# Patient Record
Sex: MALE
[De-identification: names, ages, dates, MRNs, and addresses within clinical notes are randomized per-mention and may not be internally consistent; named-entity substitution may affect disease eponyms.]

---

## 2017-01-21 ENCOUNTER — CHARTING TRANS (OUTPATIENT)
Dept: OTHER | Age: 27
End: 2017-01-21

## 2017-01-21 ASSESSMENT — PAIN SCALES - GENERAL: PAINLEVEL_OUTOF10: 0

## 2017-01-28 ENCOUNTER — LAB SERVICES (OUTPATIENT)
Dept: OTHER | Age: 27
End: 2017-01-28

## 2017-01-29 ENCOUNTER — CHARTING TRANS (OUTPATIENT)
Dept: OTHER | Age: 27
End: 2017-01-29

## 2017-01-29 LAB
ALBUMIN SERPL-MCNC: 4 G/DL (ref 3.6–5.1)
ALBUMIN/GLOB SERPL: 1.2 (ref 1–2.4)
ALP SERPL-CCNC: 48 UNITS/L (ref 45–117)
ALT SERPL-CCNC: 49 UNITS/L
ANION GAP SERPL CALC-SCNC: 13 MMOL/L (ref 10–20)
AST SERPL-CCNC: 20 UNITS/L
BASOPHILS # BLD: 0 K/MCL (ref 0–0.3)
BASOPHILS NFR BLD: 0 %
BILIRUB SERPL-MCNC: 0.6 MG/DL (ref 0.2–1)
BUN SERPL-MCNC: 10 MG/DL (ref 10–20)
BUN/CREAT SERPL: 8 (ref 7–25)
CALCIUM SERPL-MCNC: 9.3 MG/DL (ref 8.4–10.2)
CHLORIDE SERPL-SCNC: 104 MMOL/L (ref 98–107)
CHOLEST SERPL-MCNC: 154 MG/DL
CHOLEST/HDLC SERPL: 3
CO2 SERPL-SCNC: 31 MMOL/L (ref 21–32)
CREAT SERPL-MCNC: 1.24 MG/DL (ref 0.67–1.17)
DIFFERENTIAL METHOD BLD: ABNORMAL
EOSINOPHIL # BLD: 0.1 K/MCL (ref 0.1–0.5)
EOSINOPHIL NFR BLD: 2 %
ERYTHROCYTE [DISTWIDTH] IN BLOOD: 13.1 % (ref 11–15)
GLOBULIN SER-MCNC: 3.3 G/DL (ref 2–4)
GLUCOSE SERPL-MCNC: 86 MG/DL (ref 65–99)
HDLC SERPL-MCNC: 51 MG/DL
HEMATOCRIT: 47.8 % (ref 39–51)
HEMOGLOBIN: 15.2 G/DL (ref 13–17)
LDLC SERPL CALC-MCNC: 88 MG/DL
LENGTH OF FAST TIME PATIENT: ABNORMAL HRS
LENGTH OF FAST TIME PATIENT: ABNORMAL HRS
LYMPHOCYTES # BLD: 2.4 K/MCL (ref 1–4.8)
LYMPHOCYTES NFR BLD: 37 %
MEAN CORPUSCULAR HEMOGLOBIN: 29.7 PG (ref 26–34)
MEAN CORPUSCULAR HGB CONC: 31.8 G/DL (ref 32–36.5)
MEAN CORPUSCULAR VOLUME: 93.5 FL (ref 78–100)
MONOCYTES # BLD: 0.5 K/MCL (ref 0.3–0.9)
MONOCYTES NFR BLD: 8 %
NEUTROPHILS # BLD: 3.4 K/MCL (ref 1.8–7.7)
NEUTROPHILS NFR BLD: 53 %
NONHDLC SERPL-MCNC: 103 MG/DL
PLATELET COUNT: 227 K/MCL (ref 140–450)
POTASSIUM SERPL-SCNC: 4.3 MMOL/L (ref 3.4–5.1)
RED CELL COUNT: 5.11 MIL/MCL (ref 4.5–5.9)
SODIUM SERPL-SCNC: 144 MMOL/L (ref 135–145)
TOTAL PROTEIN: 7.3 G/DL (ref 6.4–8.2)
TRIGL SERPL-MCNC: 77 MG/DL
WHITE BLOOD COUNT: 6.4 K/MCL (ref 4.2–11)

## 2018-11-06 VITALS
SYSTOLIC BLOOD PRESSURE: 110 MMHG | DIASTOLIC BLOOD PRESSURE: 68 MMHG | HEIGHT: 67 IN | BODY MASS INDEX: 24.81 KG/M2 | OXYGEN SATURATION: 100 % | TEMPERATURE: 96.1 F | HEART RATE: 56 BPM | WEIGHT: 158.07 LBS | RESPIRATION RATE: 16 BRPM

## 2020-08-24 ENCOUNTER — ANCILLARY PROCEDURE (OUTPATIENT)
Dept: GENERAL RADIOLOGY | Facility: CLINIC | Age: 30
End: 2020-08-24
Attending: INTERNAL MEDICINE
Payer: COMMERCIAL

## 2020-08-24 ENCOUNTER — OFFICE VISIT (OUTPATIENT)
Dept: FAMILY MEDICINE | Facility: CLINIC | Age: 30
End: 2020-08-24
Payer: COMMERCIAL

## 2020-08-24 VITALS
TEMPERATURE: 97.7 F | HEIGHT: 66 IN | WEIGHT: 156 LBS | BODY MASS INDEX: 25.07 KG/M2 | SYSTOLIC BLOOD PRESSURE: 116 MMHG | DIASTOLIC BLOOD PRESSURE: 70 MMHG | OXYGEN SATURATION: 98 % | HEART RATE: 74 BPM

## 2020-08-24 DIAGNOSIS — R07.89 ATYPICAL CHEST PAIN: Primary | ICD-10-CM

## 2020-08-24 DIAGNOSIS — Z13.220 LIPID SCREENING: ICD-10-CM

## 2020-08-24 DIAGNOSIS — R06.83 SNORING: ICD-10-CM

## 2020-08-24 DIAGNOSIS — R01.1 HEART MURMUR: ICD-10-CM

## 2020-08-24 DIAGNOSIS — R07.89 ATYPICAL CHEST PAIN: ICD-10-CM

## 2020-08-24 DIAGNOSIS — R22.2 LUMP OF SKIN OF BACK: ICD-10-CM

## 2020-08-24 LAB
BASOPHILS # BLD AUTO: 0 10E9/L (ref 0–0.2)
BASOPHILS NFR BLD AUTO: 0.4 %
D DIMER PPP FEU-MCNC: <0.3 UG/ML FEU (ref 0–0.5)
DIFFERENTIAL METHOD BLD: NORMAL
EOSINOPHIL # BLD AUTO: 0.1 10E9/L (ref 0–0.7)
EOSINOPHIL NFR BLD AUTO: 1.4 %
ERYTHROCYTE [DISTWIDTH] IN BLOOD BY AUTOMATED COUNT: 12.7 % (ref 10–15)
HCT VFR BLD AUTO: 44.4 % (ref 40–53)
HGB BLD-MCNC: 15.3 G/DL (ref 13.3–17.7)
LYMPHOCYTES # BLD AUTO: 1.6 10E9/L (ref 0.8–5.3)
LYMPHOCYTES NFR BLD AUTO: 27.5 %
MCH RBC QN AUTO: 31.2 PG (ref 26.5–33)
MCHC RBC AUTO-ENTMCNC: 34.5 G/DL (ref 31.5–36.5)
MCV RBC AUTO: 91 FL (ref 78–100)
MONOCYTES # BLD AUTO: 0.5 10E9/L (ref 0–1.3)
MONOCYTES NFR BLD AUTO: 8.1 %
NEUTROPHILS # BLD AUTO: 3.6 10E9/L (ref 1.6–8.3)
NEUTROPHILS NFR BLD AUTO: 62.6 %
PLATELET # BLD AUTO: 215 10E9/L (ref 150–450)
RBC # BLD AUTO: 4.9 10E12/L (ref 4.4–5.9)
TROPONIN I SERPL-MCNC: <0.015 UG/L (ref 0–0.04)
WBC # BLD AUTO: 5.7 10E9/L (ref 4–11)

## 2020-08-24 PROCEDURE — 80061 LIPID PANEL: CPT | Performed by: INTERNAL MEDICINE

## 2020-08-24 PROCEDURE — 99204 OFFICE O/P NEW MOD 45 MIN: CPT | Performed by: INTERNAL MEDICINE

## 2020-08-24 PROCEDURE — 93000 ELECTROCARDIOGRAM COMPLETE: CPT | Performed by: INTERNAL MEDICINE

## 2020-08-24 PROCEDURE — 71046 X-RAY EXAM CHEST 2 VIEWS: CPT

## 2020-08-24 PROCEDURE — 85379 FIBRIN DEGRADATION QUANT: CPT | Performed by: INTERNAL MEDICINE

## 2020-08-24 PROCEDURE — 36415 COLL VENOUS BLD VENIPUNCTURE: CPT | Performed by: INTERNAL MEDICINE

## 2020-08-24 PROCEDURE — 80053 COMPREHEN METABOLIC PANEL: CPT | Performed by: INTERNAL MEDICINE

## 2020-08-24 PROCEDURE — 84484 ASSAY OF TROPONIN QUANT: CPT | Performed by: INTERNAL MEDICINE

## 2020-08-24 PROCEDURE — 85025 COMPLETE CBC W/AUTO DIFF WBC: CPT | Performed by: INTERNAL MEDICINE

## 2020-08-24 ASSESSMENT — MIFFLIN-ST. JEOR: SCORE: 1610.36

## 2020-08-24 NOTE — PROGRESS NOTES
"Subjective     Maged Corbin is a 29 year old male who presents to clinic today for the following health issues:    HPI     Patient presenting to discuss multiple medical concerns.  He has a mass on the right upper back over the right shoulder blade, also has history of snoring, reports his mother has sleep apnea.  He would like that evaluated.  He describes over the last 60 days he has been having some atypical chest pain symptoms, 70 days ago he had midsternal chest pain that woke him up at night as he describes, denies chest pain is exertion, he ran 20 miles last week 3 miles per day without any chest symptoms.  He was tested for COVID twice last was 1 week ago.  He still has residual chest symptoms, he described the symptoms were not there 6 months ago.  Denies any associated cough pleuritic chest pain or shortness of breath or phlegm production.  Denies any wheezing or shortness of breath.  The pain is more in the midsternal but does not radiate to the back.  He lifts weights and benchpress 3-4 times per week, he has been taking Tylenol as needed for pain, the pain has been ranging 3-4 out of 10 3 to 4 weeks ago.  Now the pain is 1-1/2 over 10.  He went to [place] for 1 week, he did not feel the pain, he started running and then started hurting delete that, as described pain does not radiate.  No family history of heart disease his mother has diabetes as well as maternal grandmother.  No previous surgical history.    Review of Systems   Constitutional, HEENT, cardiovascular, pulmonary, GI, , musculoskeletal, neuro, skin, endocrine and psych systems are negative, except as otherwise noted.      Objective    /70 (BP Location: Left arm, Patient Position: Chair, Cuff Size: Adult Regular)   Pulse 74   Temp 97.7  F (36.5  C) (Temporal)   Ht 1.676 m (5' 6\")   Wt 70.8 kg (156 lb)   SpO2 98%   BMI 25.18 kg/m    Body mass index is 25.18 kg/m .  Physical Exam   GENERAL: healthy, alert and no " distress  EYES: Eyes grossly normal to inspection, PERRL and conjunctivae and sclerae normal    NECK: no adenopathy, no asymmetry, masses, or scars and thyroid normal to palpation  RESP: lungs clear to auscultation - no rales, rhonchi or wheezes  Chest: No tenderness over the costal chondral joints  CV: regular rate and rhythm, normal S1 S2, no S3 or S4, grade 2 systolic murmur.  In the left lower sternal borders be appreciated in the left upper and right upper sternal border, no thrill or heave no click or rub, no peripheral edema and peripheral pulses strong  ABDOMEN: soft, nontender, no hepatosplenomegaly, no masses and bowel sounds normal  MS: no gross musculoskeletal defects noted, no edema  SKIN: no suspicious lesions or rashes, there is a 4.5 x 3.5 inches soft tissue lump over the right shoulder blade suspicious of lipoma no overlying skin changes.  Lump not tender to palpation, no purulence collection, no overlying erythema.  NEURO: Normal strength and tone, mentation intact and speech normal  PSYCH: mentation appears normal, affect normal/bright            Assessment & Plan   Problem List Items Addressed This Visit     None      Visit Diagnoses     Atypical chest pain    -  Primary    Relevant Orders    XR Chest 2 Views (Completed)    D dimer, quantitative (Completed)    Troponin I (Completed)    EKG 12-lead complete w/read - Clinics (Completed)    CBC with platelets and differential (Completed)    Comprehensive metabolic panel (BMP + Alb, Alk Phos, ALT, AST, Total. Bili, TP) (Completed)    Heart murmur        Relevant Orders    Echocardiogram Complete    Lipid screening        Relevant Orders    Lipid panel reflex to direct LDL Non-fasting (Completed)    Lump of skin of back        Relevant Orders    GENERAL SURG ADULT REFERRAL    POC US SOFT TISSUE    Snoring        Relevant Orders    SLEEP EVALUATION & MANAGEMENT REFERRAL - ADULT -[facility] [phone]  (Age 18 and up)        "  Probably his chest symptoms are more musculoskeletal, could be anxiety triggered as well.  Possibility of pleurisy or pericarditis, his EKG does show nonspecific diffuse J-point elevation.  He does have a grade 2 murmur that is diffuse in his chest could be functional murmur because of its diffuse nature, rule out LV outflow tract obstruction.  Patient denies any symptoms with exertion though which is reassuring.  He will need to schedule an echo.  Also will refer him to sleep evaluation for possible sleep study.  As for the lump of the back is large in size probably is a lipoma soft in character to 3.5 x 4.5 inches and dimension will refer to general surgery for further evaluation, patient would like to proceed with an ultrasound first.  We will check some labs including screening lipid panel HbA1c and d-dimer to rule out remote possibility of PE and a chest x-ray and cardiac enzyme.  Patient in agreement with the plan.  We may consider cardiac stress test pending evaluation of the echo first.    BMI:   Estimated body mass index is 25.18 kg/m  as calculated from the following:    Height as of this encounter: 1.676 m (5' 6\").    Weight as of this encounter: 70.8 kg (156 lb).   Weight management plan: Discussed healthy diet and exercise guidelines          Work on weight loss  Regular exercise  See Patient Instructions  No follow-ups on file.    Vimal Hernandez MD  [facility]    "

## 2020-08-25 LAB
ALBUMIN SERPL-MCNC: 3.9 G/DL (ref 3.4–5)
ALP SERPL-CCNC: 43 U/L (ref 40–150)
ALT SERPL W P-5'-P-CCNC: 24 U/L (ref 0–70)
ANION GAP SERPL CALCULATED.3IONS-SCNC: 7 MMOL/L (ref 3–14)
AST SERPL W P-5'-P-CCNC: 18 U/L (ref 0–45)
BILIRUB SERPL-MCNC: 0.4 MG/DL (ref 0.2–1.3)
BUN SERPL-MCNC: 9 MG/DL (ref 7–30)
CALCIUM SERPL-MCNC: 9 MG/DL (ref 8.5–10.1)
CHLORIDE SERPL-SCNC: 106 MMOL/L (ref 94–109)
CHOLEST SERPL-MCNC: 206 MG/DL
CO2 SERPL-SCNC: 27 MMOL/L (ref 20–32)
CREAT SERPL-MCNC: 1.19 MG/DL (ref 0.66–1.25)
GFR SERPL CREATININE-BSD FRML MDRD: 81 ML/MIN/{1.73_M2}
GLUCOSE SERPL-MCNC: 66 MG/DL (ref 70–99)
HDLC SERPL-MCNC: 60 MG/DL
LDLC SERPL CALC-MCNC: 107 MG/DL
NONHDLC SERPL-MCNC: 146 MG/DL
POTASSIUM SERPL-SCNC: 4.1 MMOL/L (ref 3.4–5.3)
PROT SERPL-MCNC: 8 G/DL (ref 6.8–8.8)
SODIUM SERPL-SCNC: 140 MMOL/L (ref 133–144)
TRIGL SERPL-MCNC: 196 MG/DL

## 2020-08-26 ENCOUNTER — OFFICE VISIT (OUTPATIENT)
Dept: SURGERY | Facility: CLINIC | Age: 30
End: 2020-08-26
Attending: INTERNAL MEDICINE
Payer: COMMERCIAL

## 2020-08-26 VITALS
SYSTOLIC BLOOD PRESSURE: 120 MMHG | WEIGHT: 156 LBS | BODY MASS INDEX: 25.07 KG/M2 | DIASTOLIC BLOOD PRESSURE: 80 MMHG | HEIGHT: 66 IN | HEART RATE: 58 BPM

## 2020-08-26 DIAGNOSIS — R22.2 LUMP OF SKIN OF BACK: ICD-10-CM

## 2020-08-26 PROCEDURE — 99203 OFFICE O/P NEW LOW 30 MIN: CPT | Performed by: SURGERY

## 2020-08-26 ASSESSMENT — MIFFLIN-ST. JEOR: SCORE: 1610.36

## 2020-08-26 NOTE — PROGRESS NOTES
We are asked by Dr. Hernandez [place] Sharlene to see the patient in consultation concerning a back mass    CC: right posterior back mass    HPI:  He noticed a back mass about  3years ago. It has grown. No pain, drainage, similar lumps elsewhere, history of cancer. No trauma    Past Medical History:   has no past medical history on file.    Past Surgical History:  No past surgical history on file.   Additional abdominal operations: none    Social History:  Social History     Socioeconomic History     Marital status:      Spouse name: Not on file     Number of children: Not on file     Years of education: Not on file     Highest education level: Not on file   Occupational History     Not on file   Social Needs     Financial resource strain: Not on file     Food insecurity     Worry: Not on file     Inability: Not on file     Transportation needs     Medical: Not on file     Non-medical: Not on file   Tobacco Use     Smoking status: Never Smoker     Smokeless tobacco: Never Used   Substance and Sexual Activity     Alcohol use: Yes     Comment: 1 drink per month      Drug use: Never     Sexual activity: Yes     Partners: Female   Lifestyle     Physical activity     Days per week: Not on file     Minutes per session: Not on file     Stress: Not on file   Relationships     Social connections     Talks on phone: Not on file     Gets together: Not on file     Attends Yarsani service: Not on file     Active member of club or organization: Not on file     Attends meetings of clubs or organizations: Not on file     Relationship status: Not on file     Intimate partner violence     Fear of current or ex partner: Not on file     Emotionally abused: Not on file     Physically abused: Not on file     Forced sexual activity: Not on file   Other Topics Concern     Not on file   Social History Narrative     Not on file        Family History:  No family history on file.    Review of Systems:  The 10 point Review of  "Systems is negative other than noted in the HPI and above.    Physical Exam:  /80   Pulse 58   Ht 1.676 m (5' 6\")   Wt 70.8 kg (156 lb)   BMI 25.18 kg/m    General - Well developed, well nourished male in no apparent distress  HEENT:  Head normocephalic and atraumatic, pupils equal and round, conjunctivae clear, no scleral icterus, mucous membranes moist, external ears and nose normal  Neck: Supple without thyromegaly or masses  Lymphatic: No cervical, or supraclavicular lymphadenopathy, no right posterior nodes.  Lungs: normal respiratory effort, no shortness of breath  Back:  5x6cm right back mass, rubbery, mobile, no pit overlying it.   Cardiovascular: no gross edema, good peripheral pulses  Extremities: Warm without edema  Musculoskeletal:  Normal station and gait  Neurologic: nonfocal  Psychiatric: Mood and affect appropriate, thought process good  Skin: Without lesions or rashes, or jaundice    Labs: normal two days ago    Imaging: CXR: normal 8/24/20    Imp:  Probable lipoma, possible cyst, risk of malignancy low.   Could excise, image, leave alone. Needle biopsy not useful. Risks of excision reviewed in detail: bleeding, recurrence, infection, seroma formation.    Plan:He is leaning towards excision.     Copy to PCP  "

## 2020-08-27 ENCOUNTER — HOSPITAL ENCOUNTER (OUTPATIENT)
Dept: CARDIOLOGY | Facility: CLINIC | Age: 30
Discharge: HOME | End: 2020-08-27
Attending: INTERNAL MEDICINE | Admitting: INTERNAL MEDICINE
Payer: COMMERCIAL

## 2020-08-27 DIAGNOSIS — R01.1 HEART MURMUR: ICD-10-CM

## 2020-08-27 PROCEDURE — 93306 TTE W/DOPPLER COMPLETE: CPT

## 2020-08-27 PROCEDURE — 93306 TTE W/DOPPLER COMPLETE: CPT | Mod: 26 | Performed by: INTERNAL MEDICINE

## 2020-08-28 DIAGNOSIS — R01.1 HEART MURMUR: ICD-10-CM

## 2020-08-28 DIAGNOSIS — R93.1 ABNORMAL ECHOCARDIOGRAM: ICD-10-CM

## 2020-08-28 DIAGNOSIS — R07.89 ATYPICAL CHEST PAIN: Primary | ICD-10-CM

## 2020-08-28 NOTE — PROGRESS NOTES
Echocardiogram shows normal LV ejection fraction, mild tricuspid regurg +1, mild left atrial enlargement, slightly elevated right-sided pressures estimated right ventricle systolic pressure of 30 mmHg plus right atrial pressure.  Patient does have a murmur on auscultation probably due to TR.  Refer to cardiology for further evaluation

## 2020-08-28 NOTE — RESULT ENCOUNTER NOTE
"Mason, I reviewed your echo result, the function looks good; both right and left sides of the heart, ejection fraction [squeeze function of the heart] 60 to 65% which is normal, there is mention of mild dilated left upper chamber [\"mild left atrial enlargement] unsure etiology of that and also there is mild leakage of 1 of the valves on the right side, tricuspid regurg which is of no significance by itself, pulmonary pressure estimated is slightly elevated according to the echo reading of [30 mmHg plus right atrial pressure] according to the numbers, I would like you to see cardiology for further evaluation, again these trivial findings of mild left atrial enlargement and slightly elevated pulmonary pressures might be nonsignificant findings as well and would not explain your chest symptoms.  Also reassuring there is no fluid around the heart.  I will put a referral to cardiology who will contact you as a one-time consult.  Any further question happy to address  Dr. Hernandez"

## 2020-08-31 ENCOUNTER — TELEPHONE (OUTPATIENT)
Dept: FAMILY MEDICINE | Facility: CLINIC | Age: 30
End: 2020-08-31

## 2020-08-31 NOTE — TELEPHONE ENCOUNTER
This provider called patient advised him of his lab results Echo result chest x-ray discussed his ongoing symptoms he still complains of chest pain seems more related to stretching or movement probable musculoskeletal pain advised him he can try anti-inflammatories and see if that helps with the symptoms.  Advised him to hold on the strenuous physical activities and weightlifting for now until seen and cleared by cardiology.  Advised him on his echo results he will see cardiology as well who may consider cardiac stress test for him.  His echo does show slightly elevated pulmonary pressure and mild left atrial dilation.  He denies any breathing symptoms or dyspnea or symptoms of chronic lung disease.  Patient was very appreciative of the follow-up phone call and states he is an appointment this week with cardiology.

## 2020-09-02 ENCOUNTER — TELEPHONE (OUTPATIENT)
Dept: CARDIOLOGY | Facility: CLINIC | Age: 30
End: 2020-09-02

## 2020-09-03 ENCOUNTER — OFFICE VISIT (OUTPATIENT)
Dept: CARDIOLOGY | Facility: CLINIC | Age: 30
End: 2020-09-03
Attending: INTERNAL MEDICINE
Payer: COMMERCIAL

## 2020-09-03 VITALS
HEART RATE: 65 BPM | DIASTOLIC BLOOD PRESSURE: 72 MMHG | HEIGHT: 66 IN | BODY MASS INDEX: 25.39 KG/M2 | SYSTOLIC BLOOD PRESSURE: 110 MMHG | WEIGHT: 158 LBS

## 2020-09-03 DIAGNOSIS — R93.1 ABNORMAL ECHOCARDIOGRAM: ICD-10-CM

## 2020-09-03 DIAGNOSIS — R01.1 HEART MURMUR: ICD-10-CM

## 2020-09-03 DIAGNOSIS — R07.89 ATYPICAL CHEST PAIN: ICD-10-CM

## 2020-09-03 PROCEDURE — 99204 OFFICE O/P NEW MOD 45 MIN: CPT | Performed by: INTERNAL MEDICINE

## 2020-09-03 RX ORDER — IBUPROFEN 200 MG
200 TABLET ORAL EVERY 4 HOURS PRN
COMMUNITY

## 2020-09-03 ASSESSMENT — MIFFLIN-ST. JEOR: SCORE: 1619.43

## 2020-09-03 NOTE — PROGRESS NOTES
"Service Date: 09/03/2020      HISTORY OF PRESENT ILLNESS:  It is a pleasure for me to see this very pleasant 29-year-old -American gentleman for evaluation of chest discomfort and \"abnormal\" echocardiogram.      This is a young gentleman with no previous cardiac history.  There is also no family history of premature atherosclerotic disease, no history of diabetes, hypertension, hyperlipidemia.  He does not abuse alcohol, tobacco or drugs.      He recalls his chest pain starting several months ago during the start of the COVID pandemic.  He denies fevers, coughing, chills.  He does recall one night during which the pain was very severe.  It was substernal and stopped him from sleeping.  Since then, his pain is localized around the subxiphoid region.  It is mild.  Occasionally, when he turns his upper body, the pain would increase.  However, it does not increase with other forms of exertion nor does it change with respiration.  There is no nausea or vomiting.      Cardiac examination is completely normal.  I personally reviewed his test findings with him.  His EKG shows benign repolarization changes.  Incidentally, he is also a gentleman who exercises on a regular basis and I think this could well account for the EKG changes as well.  He also denies dizzy spells or syncopal episodes on exertion.  His recent labs look okay.  His HDL is 60 with a total cholesterol of 206, which may be less than the [place] average.      I personally reviewed his echocardiogram.  I do not think the left atrium is enlarged.  It may be at the upper limits of normal.  Fit young athletic folks can have this finding.  Certainly, on the parasternal long axis view, the measurement of 3.5 cm is within normal limits.  As for the atrial area, I think this is falsely enlarged as portions of the pulmonary vein were included in the measurement as well.      His tricuspid regurgitation is not mild.  I think it is even less.  I would grade it " at most trace to mild which is completely a normal variant.  His pulmonary artery pressures could not be accurately measured, but I feel confident that it is within normal limits.  His IVC is not engorged.      We had a nice discussion about his chest discomfort.  It is constantly there and has no features at all of cardiac type chest discomfort.  I do not think pericarditis is very likely.  He does say that the pain is somewhat less with high-dose ibuprofen.  I recommended not taking this for more than a week.      I think overall his symptoms are most likely either musculoskeletal or related to psychologic stress, which he is under due to the recent COVID pandemic.  In any case, I do not think further cardiac workup would be necessary.  Stress test would not be helpful.  I reassured him, and if he has further concerns about his pain, I asked him to consult with his primary physician.         GILBERTO MCGINNIS MD, [facility]             D: 2020   T: 2020   MT: mike      Name:     ALEJANDRO CARRASQUILLO   MRN:      [id number]        Account:      [id number]   :      1990           Service Date: 2020      Document: [id number]

## 2020-09-03 NOTE — PROGRESS NOTES
HPI and Plan:   See dictation    No orders of the defined types were placed in this encounter.      Orders Placed This Encounter   Medications     ibuprofen (ADVIL/MOTRIN) 200 MG tablet     Sig: Take 200 mg by mouth every 4 hours as needed for mild pain (up to 400mg BID)       Encounter Diagnoses   Name Primary?     Atypical chest pain      Heart murmur      Abnormal echocardiogram        CURRENT MEDICATIONS:  Current Outpatient Medications   Medication Sig Dispense Refill     ibuprofen (ADVIL/MOTRIN) 200 MG tablet Take 200 mg by mouth every 4 hours as needed for mild pain (up to 400mg BID)         ALLERGIES   No Known Allergies    PAST MEDICAL HISTORY:  History reviewed. No pertinent past medical history.    PAST SURGICAL HISTORY:  History reviewed. No pertinent surgical history.    FAMILY HISTORY:  Family History   Problem Relation Age of Onset     Hyperlipidemia Mother      Diabetes Mother      Unknown/Adopted Father      Diabetes Maternal Grandmother        SOCIAL HISTORY:  Social History     Socioeconomic History     Marital status:      Spouse name: None     Number of children: None     Years of education: None     Highest education level: None   Occupational History     None   Social Needs     Financial resource strain: None     Food insecurity     Worry: None     Inability: None     Transportation needs     Medical: None     Non-medical: None   Tobacco Use     Smoking status: Never Smoker     Smokeless tobacco: Never Used   Substance and Sexual Activity     Alcohol use: Yes     Comment: 1 drink per month      Drug use: Never     Sexual activity: Yes     Partners: Female   Lifestyle     Physical activity     Days per week: None     Minutes per session: None     Stress: None   Relationships     Social connections     Talks on phone: None     Gets together: None     Attends Rastafari service: None     Active member of club or organization: None     Attends meetings of clubs or organizations: None      "Relationship status: None     Intimate partner violence     Fear of current or ex partner: None     Emotionally abused: None     Physically abused: None     Forced sexual activity: None   Other Topics Concern     Parent/sibling w/ CABG, MI or angioplasty before 65F 55M? Not Asked   Social History Narrative     None       Review of Systems:  Skin:  Negative     Eyes:  Positive for glasses  ENT:  Negative    Respiratory:  Negative    Cardiovascular:    Positive for;chest pain  Gastroenterology: Negative    Genitourinary:  Negative    Musculoskeletal:  Negative    Neurologic:  Negative    Psychiatric:  Negative    Heme/Lymph/Imm:  Negative    Endocrine:  Negative      Physical Exam:  Vitals: /72   Pulse 65   Ht 1.676 m (5' 6\")   Wt 71.7 kg (158 lb)   BMI 25.50 kg/m      Constitutional:  cooperative, alert and oriented, well developed, well nourished, in no acute distress        Skin:  warm and dry to the touch, no apparent skin lesions or masses noted          Head:  normocephalic, no masses or lesions        Eyes:  pupils equal and round, conjunctivae and lids unremarkable, sclera white, no xanthalasma, EOMS intact, no nystagmus        Lymph:No Cervical lymphadenopathy present     ENT:  no pallor or cyanosis, dentition good        Neck:  carotid pulses are full and equal bilaterally, JVP normal, no carotid bruit        Respiratory:  normal breath sounds, clear to auscultation, normal A-P diameter, normal symmetry, normal respiratory excursion, no use of accessory muscles         Cardiac: regular rhythm, normal S1/S2, no S3 or S4, apical impulse not displaced, no murmurs, gallops or rubs                pulses full and equal, no bruits auscultated                                        GI:           Extremities and Muscular Skeletal:  no deformities, clubbing, cyanosis, erythema observed              Neurological:  no gross motor deficits        Psych:  Alert and Oriented x 3        Recent Lab Results:  LIPID " RESULTS:  Lab Results   Component Value Date    CHOL 206 (H) 08/24/2020    HDL 60 08/24/2020     (H) 08/24/2020    TRIG 196 (H) 08/24/2020       LIVER ENZYME RESULTS:  Lab Results   Component Value Date    AST 18 08/24/2020    ALT 24 08/24/2020       CBC RESULTS:  Lab Results   Component Value Date    WBC 5.7 08/24/2020    RBC 4.90 08/24/2020    HGB 15.3 08/24/2020    HCT 44.4 08/24/2020    MCV 91 08/24/2020    MCH 31.2 08/24/2020    MCHC 34.5 08/24/2020    RDW 12.7 08/24/2020     08/24/2020       BMP RESULTS:  Lab Results   Component Value Date     08/24/2020    POTASSIUM 4.1 08/24/2020    CHLORIDE 106 08/24/2020    CO2 27 08/24/2020    ANIONGAP 7 08/24/2020    GLC 66 (L) 08/24/2020    BUN 9 08/24/2020    CR 1.19 08/24/2020    GFRESTIMATED 81 08/24/2020    GFRESTBLACK >90 08/24/2020    LULA 9.0 08/24/2020        A1C RESULTS:  No results found for: A1C    INR RESULTS:  No results found for: INR        CC  Vimal Hernandze MD  [id number] [facility]  [place] [id number]

## 2020-09-03 NOTE — LETTER
9/3/2020    Physician No Ref-Primary  No address on file    RE: Maged Corbin       Dear Colleague,    I had the pleasure of seeing Maged Shaquille in the [facility].    HPI and Plan:   See dictation    No orders of the defined types were placed in this encounter.      Orders Placed This Encounter   Medications     ibuprofen (ADVIL/MOTRIN) 200 MG tablet     Sig: Take 200 mg by mouth every 4 hours as needed for mild pain (up to 400mg BID)       Encounter Diagnoses   Name Primary?     Atypical chest pain      Heart murmur      Abnormal echocardiogram        CURRENT MEDICATIONS:  Current Outpatient Medications   Medication Sig Dispense Refill     ibuprofen (ADVIL/MOTRIN) 200 MG tablet Take 200 mg by mouth every 4 hours as needed for mild pain (up to 400mg BID)         ALLERGIES   No Known Allergies    PAST MEDICAL HISTORY:  History reviewed. No pertinent past medical history.    PAST SURGICAL HISTORY:  History reviewed. No pertinent surgical history.    FAMILY HISTORY:  Family History   Problem Relation Age of Onset     Hyperlipidemia Mother      Diabetes Mother      Unknown/Adopted Father      Diabetes Maternal Grandmother        SOCIAL HISTORY:  Social History     Socioeconomic History     Marital status:      Spouse name: None     Number of children: None     Years of education: None     Highest education level: None   Occupational History     None   Social Needs     Financial resource strain: None     Food insecurity     Worry: None     Inability: None     Transportation needs     Medical: None     Non-medical: None   Tobacco Use     Smoking status: Never Smoker     Smokeless tobacco: Never Used   Substance and Sexual Activity     Alcohol use: Yes     Comment: 1 drink per month      Drug use: Never     Sexual activity: Yes     Partners: Female   Lifestyle     Physical activity     Days per week: None     Minutes per session: None     Stress: None   Relationships      "Social connections     Talks on phone: None     Gets together: None     Attends [employer] service: None     Active member of club or organization: None     Attends meetings of clubs or organizations: None     Relationship status: None     Intimate partner violence     Fear of current or ex partner: None     Emotionally abused: None     Physically abused: None     Forced sexual activity: None   Other Topics Concern     Parent/sibling w/ CABG, MI or angioplasty before 65F 55M? Not Asked   Social History Narrative     None       Review of Systems:  Skin:  Negative     Eyes:  Positive for glasses  ENT:  Negative    Respiratory:  Negative    Cardiovascular:    Positive for;chest pain  Gastroenterology: Negative    Genitourinary:  Negative    Musculoskeletal:  Negative    Neurologic:  Negative    Psychiatric:  Negative    Heme/Lymph/Imm:  Negative    Endocrine:  Negative      Physical Exam:  Vitals: /72   Pulse 65   Ht 1.676 m (5' 6\")   Wt 71.7 kg (158 lb)   BMI 25.50 kg/m      Constitutional:  cooperative, alert and oriented, well developed, well nourished, in no acute distress        Skin:  warm and dry to the touch, no apparent skin lesions or masses noted          Head:  normocephalic, no masses or lesions        Eyes:  pupils equal and round, conjunctivae and lids unremarkable, sclera white, no xanthalasma, EOMS intact, no nystagmus        Lymph:No Cervical lymphadenopathy present     ENT:  no pallor or cyanosis, dentition good        Neck:  carotid pulses are full and equal bilaterally, JVP normal, no carotid bruit        Respiratory:  normal breath sounds, clear to auscultation, normal A-P diameter, normal symmetry, normal respiratory excursion, no use of accessory muscles         Cardiac: regular rhythm, normal S1/S2, no S3 or S4, apical impulse not displaced, no murmurs, gallops or rubs                pulses full and equal, no bruits auscultated                                        GI:       "     Extremities and Muscular Skeletal:  no deformities, clubbing, cyanosis, erythema observed              Neurological:  no gross motor deficits        Psych:  Alert and Oriented x 3        Recent Lab Results:  LIPID RESULTS:  Lab Results   Component Value Date    CHOL 206 (H) 08/24/2020    HDL 60 08/24/2020     (H) 08/24/2020    TRIG 196 (H) 08/24/2020       LIVER ENZYME RESULTS:  Lab Results   Component Value Date    AST 18 08/24/2020    ALT 24 08/24/2020       CBC RESULTS:  Lab Results   Component Value Date    WBC 5.7 08/24/2020    RBC 4.90 08/24/2020    HGB 15.3 08/24/2020    HCT 44.4 08/24/2020    MCV 91 08/24/2020    MCH 31.2 08/24/2020    MCHC 34.5 08/24/2020    RDW 12.7 08/24/2020     08/24/2020       BMP RESULTS:  Lab Results   Component Value Date     08/24/2020    POTASSIUM 4.1 08/24/2020    CHLORIDE 106 08/24/2020    CO2 27 08/24/2020    ANIONGAP 7 08/24/2020    GLC 66 (L) 08/24/2020    BUN 9 08/24/2020    CR 1.19 08/24/2020    GFRESTIMATED 81 08/24/2020    GFRESTBLACK >90 08/24/2020    LULA 9.0 08/24/2020        A1C RESULTS:  No results found for: A1C    INR RESULTS:  No results found for: INR        CC  Vimal Hernandez MD  [address]  [place] [address]                  Thank you for allowing me to participate in the care of your patient.      Sincerely,     DR GILBERTO MCGINNIS MD     [facility]    cc:   Vimal Hernandez MD  [address]  [place] [address]

## 2021-01-04 ENCOUNTER — HEALTH MAINTENANCE LETTER (OUTPATIENT)
Age: 31
End: 2021-01-04

## 2021-10-11 ENCOUNTER — HEALTH MAINTENANCE LETTER (OUTPATIENT)
Age: 31
End: 2021-10-11

## 2022-01-30 ENCOUNTER — HEALTH MAINTENANCE LETTER (OUTPATIENT)
Age: 32
End: 2022-01-30

## 2022-09-24 ENCOUNTER — HEALTH MAINTENANCE LETTER (OUTPATIENT)
Age: 32
End: 2022-09-24

## 2023-03-07 ENCOUNTER — LAB (OUTPATIENT)
Dept: LAB | Facility: CLINIC | Age: 33
End: 2023-03-07
Payer: COMMERCIAL

## 2023-03-07 ENCOUNTER — OFFICE VISIT (OUTPATIENT)
Dept: FAMILY MEDICINE | Facility: CLINIC | Age: 33
End: 2023-03-07
Payer: COMMERCIAL

## 2023-03-07 VITALS
HEART RATE: 79 BPM | WEIGHT: 165 LBS | SYSTOLIC BLOOD PRESSURE: 115 MMHG | DIASTOLIC BLOOD PRESSURE: 72 MMHG | HEIGHT: 67 IN | RESPIRATION RATE: 16 BRPM | OXYGEN SATURATION: 98 % | BODY MASS INDEX: 25.9 KG/M2 | TEMPERATURE: 99.2 F

## 2023-03-07 DIAGNOSIS — R10.13 EPIGASTRIC PAIN: ICD-10-CM

## 2023-03-07 DIAGNOSIS — R10.13 EPIGASTRIC PAIN: Primary | ICD-10-CM

## 2023-03-07 PROCEDURE — 99213 OFFICE O/P EST LOW 20 MIN: CPT

## 2023-03-07 ASSESSMENT — PAIN SCALES - GENERAL: PAINLEVEL: NO PAIN (0)

## 2023-03-07 NOTE — PROGRESS NOTES
"  Assessment & Plan     Epigastric pain  - Helicobacter pylori Antigen Stool; Future  Unclear etiology for patient's epigastric pain.  He did have a negative work-up in the ED back in January.  We will test him for H. pylori given that this was not performed in the emergency department.  We may also consider recurrence kidney stones as a differential, as a kidney stone was noted in his CT scan.  I advised him that he could let me know if symptoms flareup again, and we can try and test his urine for kidney stone symptoms.    Return if symptoms worsen or fail to improve, for Follow up.    Mitchell Gaines NP  [facility]    Ingrid Cuevas is a 32 year old accompanied by his self, presenting for the following health issues:  Cold Symptoms and Abdominal Pain    Recurrent epigastric abdominal pain:    Having the same pain from the ED, this time a 3-4/10. Took sucralfate and it help relieve the pain throughout the course of the day. Last time he took the sucralfate was the day he made his appointment (02/17).   He felt it over the span of hours, then take the sucralfate, and took it easy for 2 hours, and it improved. Epigastric pain location. This time, he felt the pain after evening. One-time episode on February 17, no correlation with food (but first episode had a correlation with food). Rising to be severe in the same area (non-radiating). No nausea/vomiting. No bloating. No changes to the stool: diarrhea/constipation. No painful urination.  \"Sharp pain.\" \"Weakening pain\" quality.    Had a cardiac workup in the ER, which was negative. Saw cardiology in the past for murmur, which was shown to be benign (trace tricuspid regurgitation).     Famotidine, Sucralfate and Norco. No longer taking these, stopped taking them 2-3 days after taking them.     Ring finger: more in the habit of wearing a left ring finger. Patient had saw the top of the ring finger getting larger. Patient was able to cut " "it.        History of Present Illness       Reason for visit:  Abdominal pain    He eats 2-3 servings of fruits and vegetables daily.He consumes 0 sweetened beverage(s) daily.He exercises with enough effort to increase his heart rate 20 to 29 minutes per day.  He exercises with enough effort to increase his heart rate 4 days per week.   He is taking medications regularly.     Review of Systems   Constitutional, HEENT, cardiovascular, pulmonary, gi and gu systems are negative, except as otherwise noted.      Objective    /72   Pulse 79   Temp 99.2  F (37.3  C) (Temporal)   Resp 16   Ht 1.69 m (5' 6.54\")   Wt 74.8 kg (165 lb)   SpO2 98%   BMI 26.21 kg/m    Body mass index is 26.21 kg/m .  Physical Exam   GENERAL: healthy, alert and no distress  ABDOMEN: soft, nontender, no hepatosplenomegaly, no masses and bowel sounds normal  MS: no gross musculoskeletal defects noted, no edema  SKIN: no suspicious lesions or rashes  NEURO: Normal strength and tone, mentation intact and speech normal  PSYCH: mentation appears normal, affect normal/bright        "

## 2023-05-08 ENCOUNTER — HEALTH MAINTENANCE LETTER (OUTPATIENT)
Age: 33
End: 2023-05-08

## 2023-08-03 ENCOUNTER — OFFICE VISIT (OUTPATIENT)
Dept: SURGERY | Facility: CLINIC | Age: 33
End: 2023-08-03
Payer: COMMERCIAL

## 2023-08-03 VITALS
SYSTOLIC BLOOD PRESSURE: 124 MMHG | BODY MASS INDEX: 25.9 KG/M2 | HEART RATE: 61 BPM | OXYGEN SATURATION: 99 % | HEIGHT: 67 IN | DIASTOLIC BLOOD PRESSURE: 60 MMHG | WEIGHT: 165 LBS

## 2023-08-03 DIAGNOSIS — D17.1 LIPOMA OF TORSO: Primary | ICD-10-CM

## 2023-08-03 PROCEDURE — 99213 OFFICE O/P EST LOW 20 MIN: CPT | Performed by: SURGERY

## 2023-08-03 RX ORDER — PHENOL 1.4 %
10 AEROSOL, SPRAY (ML) MUCOUS MEMBRANE
COMMUNITY

## 2023-08-04 ENCOUNTER — TELEPHONE (OUTPATIENT)
Dept: SURGERY | Facility: CLINIC | Age: 33
End: 2023-08-04
Payer: COMMERCIAL

## 2023-08-06 NOTE — PROGRESS NOTES
"[facility]  Surgery Consultation    PCP:  Vimal Hernandez MD     HPI: Patient is a 32-year-old gentleman who presents as a referral from the above provider for consultation regarding a back mass.  This has been present for several years.  He had a prior consultation with a now retired partner back in 2020.  Since then the mass seems to be continuing to increase in size.  It causes discomfort when lying on it or with direct pressure.    PMH:   has no past medical history on file.  PSH:    has no past surgical history on file.  Social History:   reports that he has never smoked. He has never used smokeless tobacco. He reports current alcohol use. He reports that he does not use drugs.  Family History:   family history includes Diabetes in his maternal grandmother and mother; Hyperlipidemia in his mother; Unknown/Adopted in his father.  Medications/Allergies: Home medications and allergies reviewed.    ROS:  The 10 point Review of Systems is negative other than noted in the HPI.    Physical Exam:  /60 (BP Location: Right arm, Patient Position: Chair, Cuff Size: Adult Regular)   Pulse 61   Ht 1.69 m (5' 6.54\")   Wt 74.8 kg (165 lb)   SpO2 99%   BMI 26.20 kg/m    GENERAL: Generally appears well.  Psych: Alert and Oriented.  Normal affect  Eyes: Sclera clear  Respiratory:  Lungs clear to ausculation bilaterally with good air excursion  Cardiovascular:  Regular Rate and Rhythm with no murmurs gallops or rubs, normal peripheral pulses  GI: Abdomen Non Distended Soft Non-Tender  .  Integumentary:  No rashes, on the right back he has a moderate to large sized subcutaneous mass that is freely mobile.  Appears to be the consistency of a lipoma.  Neurological: grossly intact      All new lab and imaging data was reviewed.     Impression and Plan:  Patient is a 32 year old male with back lipoma    PLAN: Options were thoroughly discussed.  We discussed removal in the operating room under " anesthesia versus under local anesthetic.  Patient wishes to proceed with excision under local.  Risks, benefits, recovery were all discussed.  This will be scheduled at his earliest convenience.      Thank you very much for this consult.    Aly James M.D.  [facility]  [phone]    Please route or send letter to:  Primary Care Provider (PCP) and Referring Provider

## 2023-08-10 RX ORDER — LIDOCAINE HYDROCHLORIDE 10 MG/ML
10 INJECTION, SOLUTION EPIDURAL; INFILTRATION; INTRACAUDAL; PERINEURAL ONCE
Status: CANCELLED | OUTPATIENT
Start: 2023-08-10

## 2023-08-11 ENCOUNTER — APPOINTMENT (OUTPATIENT)
Dept: SURGERY | Facility: PHYSICIAN GROUP | Age: 33
End: 2023-08-11
Payer: COMMERCIAL

## 2023-08-11 ENCOUNTER — HOSPITAL ENCOUNTER (OUTPATIENT)
Facility: CLINIC | Age: 33
Discharge: HOME | End: 2023-08-11
Attending: SURGERY | Admitting: SURGERY
Payer: COMMERCIAL

## 2023-08-11 VITALS
RESPIRATION RATE: 14 BRPM | OXYGEN SATURATION: 97 % | SYSTOLIC BLOOD PRESSURE: 112 MMHG | DIASTOLIC BLOOD PRESSURE: 75 MMHG

## 2023-08-11 PROCEDURE — 250N000009 HC RX 250: Performed by: SURGERY

## 2023-08-11 PROCEDURE — 13100 CMPLX RPR TRUNK 1.1-2.5 CM: CPT | Mod: 59 | Performed by: SURGERY

## 2023-08-11 PROCEDURE — 88304 TISSUE EXAM BY PATHOLOGIST: CPT | Mod: 26 | Performed by: PATHOLOGY

## 2023-08-11 PROCEDURE — 21931 EXC BACK LES SC 3 CM/>: CPT | Performed by: SURGERY

## 2023-08-11 PROCEDURE — 88304 TISSUE EXAM BY PATHOLOGIST: CPT | Mod: TC | Performed by: SURGERY

## 2023-08-11 PROCEDURE — 11404 EXC TR-EXT B9+MARG 3.1-4 CM: CPT | Performed by: SURGERY

## 2023-08-11 ASSESSMENT — ACTIVITIES OF DAILY LIVING (ADL)
ADLS_ACUITY_SCORE: 35

## 2023-08-11 NOTE — OP NOTE
Preoperative diagnosis: Subcutaneous mass right upper back    Postoperative diagnosis: Same    Procedure: Excision of subcutaneous mass right upper back 12 cm in greatest transverse dimension with complex multilayer closure    Surgeon: Aly James MD    Anesthesia: Local    Estimated blood loss: 5 cc    Specimens: Subcutaneous mass right upper back    Indication for procedure: This is a 32-year-old gentleman who has had a mass in his right upper back for some time.  It has increased in size over time.  It causes occasional mild discomfort as it is gotten larger and with direct pressure.  In the office we discussed his management options.  I discussed excision under local versus in the operating room under anesthesia.  Patient wished to proceed with procedure under local.  The potential risks of bleeding, infection, recurrence were discussed.  His questions were answered and he wished to proceed.    Procedure: After informed consent was obtained the patient was taken to one of the endoscopy suites at [facility].  He was placed prone on the procedure table.  The area in question was then sterilely prepped and draped in usual manner.  Local anesthetic was then injected to create a field block.  Transverse incision was made overlying the palpable mass.  Combination of sharp as well as electrocautery dissection was then carried into the subcutaneous tissues.  The mass was then encountered.  It was a multilobulated well encapsulated lipoma that extended all the way down to the muscular fascia.  Tedious dissection was used to circumferentially mobilized and excised this large mass.  It was excised completely and intact.  Hemostasis was assured use electrocautery.  I then proceeded with complex layered closure.  The capsule that had been around the mass was closed with interrupted 3-0 Vicryl sutures in an attempt to close off some of the dead space.  The deep subcutaneous layer was then closed with  interrupted 3-0 Vicryl sutures.  The skin incision itself was then closed with a running subcuticular 3-0 Monocryl suture.  Mastisol and Steri-Strips were applied.  Patient tolerated this well.  He left in stable and ambulatory condition.    Aly James MD

## 2023-08-12 ASSESSMENT — ACTIVITIES OF DAILY LIVING (ADL)
ADLS_ACUITY_SCORE: 35

## 2023-08-13 ASSESSMENT — ACTIVITIES OF DAILY LIVING (ADL)
ADLS_ACUITY_SCORE: 35

## 2023-08-14 ASSESSMENT — ACTIVITIES OF DAILY LIVING (ADL)
ADLS_ACUITY_SCORE: 35

## 2023-08-15 ENCOUNTER — TELEPHONE (OUTPATIENT)
Dept: SURGERY | Facility: CLINIC | Age: 33
End: 2023-08-15
Payer: COMMERCIAL

## 2023-08-15 ENCOUNTER — MYC MEDICAL ADVICE (OUTPATIENT)
Dept: SURGERY | Facility: CLINIC | Age: 33
End: 2023-08-15
Payer: COMMERCIAL

## 2023-08-15 LAB
PATH REPORT.COMMENTS IMP SPEC: NORMAL
PATH REPORT.COMMENTS IMP SPEC: NORMAL
PATH REPORT.FINAL DX SPEC: NORMAL
PATH REPORT.GROSS SPEC: NORMAL
PATH REPORT.MICROSCOPIC SPEC OTHER STN: NORMAL
PATH REPORT.RELEVANT HX SPEC: NORMAL
PHOTO IMAGE: NORMAL

## 2023-08-15 ASSESSMENT — ACTIVITIES OF DAILY LIVING (ADL)
ADLS_ACUITY_SCORE: 35

## 2023-08-15 NOTE — TELEPHONE ENCOUNTER
Surgery: Lipoma excision to back  Date: 8/11/2023  Surgeon: KORTNEY        Patient heard a pop to his back incision last Friday.   Wasn't doing any extensive activity.   Denies any open spots, sutures poking through, new drainage, swelling, and redness. No pain. Steri strips in place. Patient sent photo to MyChart. Normal looking incision, patient to monitor, call or mychart back if any infection symptoms.  Will route to provider as well.                 Tiffani Vincent RN on 8/15/2023 at 4:11 PM

## 2023-08-15 NOTE — TELEPHONE ENCOUNTER
Patient had a back lipoma excised 8/11/23 [facility] and has questions about the sutures today. He heard a pop and is not sure if he popped a stitch or if it was just the tape moving around.    Please call    Phone: [phone]   Message ok

## 2023-08-15 NOTE — TELEPHONE ENCOUNTER
Surgery: Lipoma excision to back  Date: 8/11/2023  Surgeon: KORTNEY       Patient heard a pop to his back incision last Friday.   Wasn't doing any extensive activity.   Denies any open spots, sutures poking through, new drainage, swelling, and redness. No pain. Steri strips in place. Patient sent photo to MyChart. Normal looking incision, patient to monitor, call or mychart back if any infection symptoms. Will route to provider.     Tiffani Vincent RN on 8/15/2023 at 4:11 PM

## 2023-08-16 NOTE — TELEPHONE ENCOUNTER
Reviewed triage RN note.  Called patient and left detailed message.  Agree with Tiffani's recommendations.  Pop could be fluid squishing around or a deep stitch releasing, but would continue to monitor and disregard it if no further symptoms.  Continue to monitor for signs of infection.  Call back if other concerns, and we would see him in clinic as needed.    Justin Ibarra PA-C  [phone]

## 2024-07-14 ENCOUNTER — HEALTH MAINTENANCE LETTER (OUTPATIENT)
Age: 34
End: 2024-07-14

## 2025-04-21 ENCOUNTER — OFFICE VISIT (OUTPATIENT)
Dept: URGENT CARE | Facility: URGENT CARE | Age: 35
End: 2025-04-21
Payer: COMMERCIAL

## 2025-04-21 ENCOUNTER — ANCILLARY PROCEDURE (OUTPATIENT)
Dept: GENERAL RADIOLOGY | Facility: CLINIC | Age: 35
End: 2025-04-21
Attending: PHYSICIAN ASSISTANT
Payer: COMMERCIAL

## 2025-04-21 VITALS
HEART RATE: 63 BPM | OXYGEN SATURATION: 99 % | SYSTOLIC BLOOD PRESSURE: 126 MMHG | BODY MASS INDEX: 26.68 KG/M2 | DIASTOLIC BLOOD PRESSURE: 72 MMHG | TEMPERATURE: 97.3 F | RESPIRATION RATE: 16 BRPM | WEIGHT: 168 LBS

## 2025-04-21 DIAGNOSIS — M79.672 PAIN OF LEFT HEEL: Primary | ICD-10-CM

## 2025-04-21 DIAGNOSIS — M72.2 PLANTAR FASCIITIS: ICD-10-CM

## 2025-04-21 PROCEDURE — 73630 X-RAY EXAM OF FOOT: CPT | Mod: TC | Performed by: RADIOLOGY

## 2025-04-21 PROCEDURE — 3078F DIAST BP <80 MM HG: CPT | Performed by: PHYSICIAN ASSISTANT

## 2025-04-21 PROCEDURE — 3074F SYST BP LT 130 MM HG: CPT | Performed by: PHYSICIAN ASSISTANT

## 2025-04-21 PROCEDURE — 99204 OFFICE O/P NEW MOD 45 MIN: CPT | Performed by: PHYSICIAN ASSISTANT

## 2025-04-21 RX ORDER — IBUPROFEN 800 MG/1
800 TABLET, FILM COATED ORAL EVERY 8 HOURS PRN
Qty: 30 TABLET | Refills: 0 | Status: SHIPPED | OUTPATIENT
Start: 2025-04-21

## 2025-04-21 NOTE — PROGRESS NOTES
"SUBJECTIVE:   Maged Corbin is a 34 year old male presenting with a chief complaint of   Chief Complaint   Patient presents with    Urgent Care     Left foot injury while running on Saturday     Foot Injury       He is a new patient of [facility].  Patient ran 2 days ago 4.5 miles.  States runs 2 miles every day on a [employer] [place].  Hx of \"ruptured achilles tendon on left\" that did not require surgery.  Pt inquiring about a handicapped sticker.      Treatment:  tylenol and ibuprofen; - 2 pills of each yesterday.  Iced yesterday.    Review of Systems    No past medical history on file.  Family History   Problem Relation Age of Onset    Hyperlipidemia Mother     Diabetes Mother     Unknown/Adopted Father     Diabetes Maternal Grandmother      Current Outpatient Medications   Medication Sig Dispense Refill    ferrous fumarate 65 mg, [employer] [place]. FE,-Vitamin C 125 mg (VITRON C)  MG TABS tablet Take 1 tablet by mouth daily      ibuprofen (ADVIL/MOTRIN) 200 MG tablet Take 200 mg by mouth every 4 hours as needed for mild pain (up to 400mg BID)      ibuprofen (ADVIL/MOTRIN) 800 MG tablet Take 1 tablet (800 mg) by mouth every 8 hours as needed for moderate pain. 30 tablet 0    magnesium chloride 535 (64 Mg) MG TBEC CR tablet Take 535 mg by mouth daily      Melatonin 10 MG TABS tablet Take 10 mg by mouth nightly as needed for sleep 3--10 mg at bedtime       Social History     Tobacco Use    Smoking status: Never    Smokeless tobacco: Never   Substance Use Topics    Alcohol use: Yes     Comment: 1 drink per month        OBJECTIVE  /72   Pulse 63   Temp 97.3  F (36.3  C) (Tympanic)   Resp 16   Wt 76.2 kg (168 lb)   SpO2 99%   BMI 26.68 kg/m      Physical Exam  Vitals reviewed.   Constitutional:       General: He is not in acute distress.     Appearance: Normal appearance. He is normal weight. He is not ill-appearing, toxic-appearing or diaphoretic.   Cardiovascular:      Rate and Rhythm: Normal rate. "   Musculoskeletal:         General: Tenderness present. No swelling or signs of injury.      Comments: Tenderness to palpation of the lateral and medial aspect of the heel.  No erythema, edema.    Ambulation with weight on ball of foot.   Skin:     General: Skin is warm and dry.      Findings: No bruising, erythema or rash.   Neurological:      Mental Status: He is alert.         Labs:  Results for orders placed or performed in visit on 04/21/25 (from the past 24 hours)   XR Foot Left G/E 3 Views    Narrative    EXAM: XR FOOT LEFT G/E 3 VIEWS  LOCATION: [facility] [place]  DATE: 4/21/2025    INDICATION:  Pain of left heel  COMPARISON: None.      Impression    IMPRESSION: The left foot is negative for fracture. There is a small accessory ossicle along the dorsal margin of the distal talus adjacent to the talonavicular joint. This is a chronic-appearing finding. The calcaneus is negative. No significant   spurring.       X-Ray was done, my findings are: Xrays reviewed by myself and independently interpreted.  Any significant discrepancies with official radiologic read, patient will be notified.      No fx.    ASSESSMENT:      ICD-10-CM    1. Pain of left heel  M79.672 XR Foot Left G/E 3 Views     ibuprofen (ADVIL/MOTRIN) 800 MG tablet     Orthopedic  Referral     Ankle/Foot Bracing Supplies Order Walking Boot; Left; Non-pneumatic     Physical Therapy  Referral      2. Plantar fasciitis  M72.2 ibuprofen (ADVIL/MOTRIN) 800 MG tablet     Orthopedic  Referral     Ankle/Foot Bracing Supplies Order Walking Boot; Left; Non-pneumatic     Physical Therapy  Referral           Medical Decision Making:    Differential Diagnosis:  Plantar fasciitis, occult fracture    Serious Comorbid Conditions:  Adult:   reviewed    PLAN:    Due to concerns of occult fracture, xray performed.   Rx for ibuprofen.  Referral to PT and ortho.  Patient offered walking boot, and placed in walking  boot - short.  Note for work.  Discussed reasons to seek immediate medical attention.  Additionally if no improvement or worsening in one week, may follow up with PCP and/or UC.        Followup:    If not improving or if condition worsens, follow up with your Primary Care Provider, If not improving or if conditions worsens over the next 12-24 hours, go to the Emergency Department    There are no Patient Instructions on file for this visit.

## 2025-04-21 NOTE — LETTER
2025    Maged Corbin   1990        To Whom it May Concern;    Please excuse Maged Corbin from work/school for a healthcare visit on 2025.    Sincerely,        YOVANA Moura

## 2025-04-21 NOTE — PROGRESS NOTES
Urgent Care Clinic Visit     Chief Complaint   Patient presents with    Urgent Care     Left foot injury while running on Saturday     Foot Injury           4/21/2025    10:10 AM   Additional Questions   Roomed by Soheila   Accompanied by alone

## 2025-04-22 ENCOUNTER — PATIENT OUTREACH (OUTPATIENT)
Dept: CARE COORDINATION | Facility: CLINIC | Age: 35
End: 2025-04-22
Payer: COMMERCIAL

## 2025-07-19 ENCOUNTER — HEALTH MAINTENANCE LETTER (OUTPATIENT)
Age: 35
End: 2025-07-19

## (undated) DEVICE — ESU PENCIL W/SMOKE EVAC CVPLP2000

## (undated) DEVICE — SU MONOCRYL 3-0 PS-2 18" UND Y497G

## (undated) DEVICE — SU VICRYL 3-0 SH 27" J316H

## (undated) DEVICE — PREP CHLORAPREP 1-STEP .67ML AMP APPLICATOR

## (undated) RX ORDER — LIDOCAINE HYDROCHLORIDE 10 MG/ML
INJECTION, SOLUTION EPIDURAL; INFILTRATION; INTRACAUDAL; PERINEURAL
Status: DISPENSED
Start: 2023-08-11